# Patient Record
Sex: FEMALE | Race: WHITE | URBAN - METROPOLITAN AREA
[De-identification: names, ages, dates, MRNs, and addresses within clinical notes are randomized per-mention and may not be internally consistent; named-entity substitution may affect disease eponyms.]

---

## 2017-07-19 ENCOUNTER — APPOINTMENT (OUTPATIENT)
Dept: GENERAL RADIOLOGY | Facility: CLINIC | Age: 37
End: 2017-07-19
Attending: EMERGENCY MEDICINE
Payer: COMMERCIAL

## 2017-07-19 ENCOUNTER — HOSPITAL ENCOUNTER (EMERGENCY)
Facility: CLINIC | Age: 37
Discharge: HOME | End: 2017-07-19
Attending: EMERGENCY MEDICINE | Admitting: EMERGENCY MEDICINE
Payer: COMMERCIAL

## 2017-07-19 VITALS
DIASTOLIC BLOOD PRESSURE: 77 MMHG | HEIGHT: 68 IN | OXYGEN SATURATION: 100 % | HEART RATE: 75 BPM | SYSTOLIC BLOOD PRESSURE: 119 MMHG | TEMPERATURE: 98.8 F | RESPIRATION RATE: 18 BRPM

## 2017-07-19 DIAGNOSIS — S20.229A BACK CONTUSION, UNSPECIFIED LATERALITY, INITIAL ENCOUNTER: ICD-10-CM
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
## 2017-07-19 PROCEDURE — 72072 X-RAY EXAM THORAC SPINE 3VWS: CPT

## 2017-07-19 PROCEDURE — 25000132 ZZH RX MED GY IP 250 OP 250 PS 637: Performed by: EMERGENCY MEDICINE

## 2017-07-19 PROCEDURE — 99285 EMERGENCY DEPT VISIT HI MDM: CPT

## 2017-07-19 PROCEDURE — 72220 X-RAY EXAM SACRUM TAILBONE: CPT

## 2017-07-19 PROCEDURE — 72100 X-RAY EXAM L-S SPINE 2/3 VWS: CPT

## 2017-07-19 RX ORDER — HYDROCODONE BITARTRATE AND ACETAMINOPHEN 5; 325 MG/1; MG/1
2 TABLET ORAL ONCE
Status: COMPLETED | OUTPATIENT
Start: 2017-07-19 | End: 2017-07-19

## 2017-07-19 RX ADMIN — HYDROCODONE BITARTRATE AND ACETAMINOPHEN 2 TABLET: 5; 325 TABLET ORAL at 20:47

## 2017-07-19 ASSESSMENT — ENCOUNTER SYMPTOMS
BACK PAIN: 1
DIZZINESS: 1
LIGHT-HEADEDNESS: 1
SHORTNESS OF BREATH: 1

## 2017-07-19 NOTE — ED AVS SNAPSHOT
Emergency Department    [address] [facility] [address]    Phone:  [phone]    Fax:  [phone]                                       Court Mast   MRN: [id number]    Department:   Emergency Department   Date of Visit:  7/19/2017           Patient Information     Date Of Birth          1980        Your diagnoses for this visit were:     Fall on stairs, initial encounter     Back contusion, unspecified laterality, initial encounter        You were seen by Danish Tamayo MD.      Follow-up Information     Follow up with  Emergency Department.    Specialty:  EMERGENCY MEDICINE    Why:  As needed, If symptoms worsen    Contact information:    [address] [facility] [address] [phone]        Discharge Instructions         Back Contusion    You have a contusion to your back. A contusion is also called a bruise. There is swelling and some bleeding under the skin. The skin may be purplish. You may have muscle aching and stiffness in the area of the bruise. There are no broken bones.  Contusions heal on their own, without further treatment. However, pain and skin discoloration may take weeks to months to go away.   Home care    Rest. Avoid heavy lifting, strenuous exertion, or any activity that causes pain.    Ice the area to reduce pain and swelling. Put ice cubes in a plastic bag or use a cold pack. (Wrap the cold source in a thin towel. Do not place it directly on your skin.) Ice the injured area for 20 minutes every 1-2 hours the first day. Continue with ice packs 3-4 times a day for the next two days, then as needed for the relief of pain and swelling.    Take any prescribed pain medication. If none was prescribed, take acetaminophen, ibuprofen, or naproxen to control pain.  Follow-up care  Follow up with your healthcare provider, or as directed. Call if you are not better in 1-2 weeks.  When to seek medical advice  Call your healthcare provider  for any of the following:    New or worsening pain    Increased swelling around the bruise    Pain spreads to one or both legs    Weakness or numbness in one or both legs     Loss of bowel or bladder control    Numbness in the groin or genital area    Fever of 100.4 F (38 C) or higher, or as directed by your healthcare provider  Date Last Reviewed: 6/26/2015 2000-2017 [employer]. [address] [place] [address], [place] [address]. All rights reserved. This information is not intended as a substitute for professional medical care. Always follow your healthcare professional's instructions.          24 Hour Appointment Hotline       To make an appointment at any [facility], call [phone] ([phone]). If you don't have a family doctor or clinic, we will help you find one. [facility] are conveniently located to serve the needs of you and your family.             Review of your medicines      Our records show that you are taking the medicines listed below. If these are incorrect, please call your family doctor or clinic.        Dose / Directions Last dose taken    LABETALOL HCL PO   Dose:  100 mg        Take 100 mg by mouth 2 times daily   Refills:  0        PRENATAL MULTIVIT-IRON PO        Refills:  0                Procedures and tests performed during your visit     Lumbar spine XR, 2-3 views    Thoracic spine XR, 3 views    XR Sacrum and Coccyx 2 Views      Orders Needing Specimen Collection     None      Pending Results     Date and Time Order Name Status Description    7/19/2017 2041 XR Sacrum and Coccyx 2 Views Preliminary     7/19/2017 2041 Lumbar spine XR, 2-3 views Preliminary     7/19/2017 2041 Thoracic spine XR, 3 views Preliminary             Pending Culture Results     No orders found from 7/17/2017 to 7/20/2017.            Pending Results Instructions     If you had any lab results that were not finalized at the time of your Discharge, you can call the ED Lab Result RN at  [phone]. You will be contacted by this team for any positive Lab results or changes in treatment. The nurses are available 7 days a week from 10A to 6:30P.  You can leave a message 24 hours per day and they will return your call.        Test Results From Your Hospital Stay        7/19/2017  9:29 PM      Narrative     THORACIC AND LUMBOSACRAL SPINE 9 VIEWS   7/19/2017 9:15 PM     HISTORY: Fall on stairs. Tailbone pain.    COMPARISON: None.        Impression     IMPRESSION: No visualized acute fracture or malalignment of the  thoracic or lumbosacral spine.         7/19/2017  9:29 PM      Narrative     THORACIC AND LUMBOSACRAL SPINE 9 VIEWS   7/19/2017 9:15 PM     HISTORY: Fall on stairs. Tailbone pain.    COMPARISON: None.        Impression     IMPRESSION: No visualized acute fracture or malalignment of the  thoracic or lumbosacral spine.         7/19/2017  9:29 PM      Narrative     THORACIC AND LUMBOSACRAL SPINE 9 VIEWS   7/19/2017 9:15 PM     HISTORY: Fall on stairs. Tailbone pain.    COMPARISON: None.        Impression     IMPRESSION: No visualized acute fracture or malalignment of the  thoracic or lumbosacral spine.                Clinical Quality Measure: Blood Pressure Screening     Your blood pressure was checked while you were in the emergency department today. The last reading we obtained was  BP: 119/77 . Please read the guidelines below about what these numbers mean and what you should do about them.  If your systolic blood pressure (the top number) is less than 120 and your diastolic blood pressure (the bottom number) is less than 80, then your blood pressure is normal. There is nothing more that you need to do about it.  If your systolic blood pressure (the top number) is 120-139 or your diastolic blood pressure (the bottom number) is 80-89, your blood pressure may be higher than it should be. You should have your blood pressure rechecked within a year by a primary care provider.  If your systolic  "blood pressure (the top number) is 140 or greater or your diastolic blood pressure (the bottom number) is 90 or greater, you may have high blood pressure. High blood pressure is treatable, but if left untreated over time it can put you at risk for heart attack, stroke, or kidney failure. You should have your blood pressure rechecked by a primary care provider within the next 4 weeks.  If your provider in the emergency department today gave you specific instructions to follow-up with your doctor or provider even sooner than that, you should follow that instruction and not wait for up to 4 weeks for your follow-up visit.        Thank you for choosing [facility]       Thank you for choosing [facility] for your care. Our goal is always to provide you with excellent care. Hearing back from our patients is one way we can continue to improve our services. Please take a few minutes to complete the written survey that you may receive in the mail after you visit with us. Thank you!        [employer] Relevant Information     [employer] lets you send messages to your doctor, view your test results, renew your prescriptions, schedule appointments and more. To sign up, go to [email or web address] . Click on \"Log in\" on the left side of the screen, which will take you to the Welcome page. Then click on \"Sign up Now\" on the right side of the page.     You will be asked to enter the access code listed below, as well as some personal information. Please follow the directions to create your username and password.     Your access code is: [email or web address]  Expires: 10/17/2017  9:55 PM     Your access code will  in 90 days. If you need help or a new code, please call your [facility] clinic or [phone].        Care EveryWhere ID     This is your Care EveryWhere ID. This could be used by other organizations to access your [facility] medical records  [id number]        Equal Access to Services     DWIGHT MOSQUEDA : phill Cortes " jaylyn gutierrez waxay idiin hayaan adeeg kharash la'aan ah. So [facility] [phone].    ATENCIÓN: Si habla jhoan, tiene a abreu disposición servicios gratuitos de asistencia lingüística. Llame al [phone].    We comply with applicable federal civil rights laws and [place] laws. We do not discriminate on the basis of race, color, national origin, age, disability sex, sexual orientation or gender identity.            After Visit Summary       This is your record. Keep this with you and show to your community pharmacist(s) and doctor(s) at your next visit.

## 2017-07-19 NOTE — ED AVS SNAPSHOT
Emergency Department    [address] [facility] [address]    Phone:  [phone]    Fax:  [phone]                                       Court Mast   MRN: [id number]    Department:   Emergency Department   Date of Visit:  7/19/2017           After Visit Summary Signature Page     I have received my discharge instructions, and my questions have been answered. I have discussed any challenges I see with this plan with the nurse or doctor.    ..........................................................................................................................................  Patient/Patient Representative Signature      ..........................................................................................................................................  Patient Representative Print Name and Relationship to Patient    ..................................................               ................................................  Date                                            Time    ..........................................................................................................................................  Reviewed by Signature/Title    ...................................................              ..............................................  Date                                                            Time

## 2017-07-20 NOTE — ED PROVIDER NOTES
"  History     Chief Complaint:  Fall       HPI   Court Mast is a 36 year old female who presents with a fall. The patient reports that 15 minutes ago she slipped down 5 stairs at home on the deck and landed on the middle of her back and her tailbone. She reports that she \"got the wind knocked out of her\" but did not hit her head or lose consciousness. Initially after the fall she could not speak, felt like she was going to pass out, and was dizzy but was able to move her legs. She notes that upon arrival to the ED her hands began to tingle and is now currently feeling lower back pain, tailbone pain, dizziness and shortness of breath. The patient did not take any medication for her symptoms following the fall. Of note, the patient had a recent cyst burst last week.    Allergies:  Nickel    Medications:    Labetalol  Prenatal Multivitamin    Past Medical History:    HTN (Controlled)    Past Surgical History:    [place] teeth extraction    Family History:    History reviewed.  No significant family history.     Social History:  Relationship status:   Tobacco use: Neg  Alcohol use: Pos (2 drinks/week)  The patient presents with her .     Review of Systems   Respiratory: Positive for shortness of breath.    Musculoskeletal: Positive for back pain.   Neurological: Positive for dizziness and light-headedness. Negative for syncope.   All other systems reviewed and are negative.    Physical Exam     Patient Vitals for the past 24 hrs:   BP Temp Temp src Pulse Resp SpO2 Height   07/19/17 2025 119/77 98.8  F (37.1  C) Oral 75 18 100 % 1.727 m (5' 8\")     Physical Exam  General: Appears well-developed and well-nourished.   Head: No signs of trauma.   Mouth/Throat: Oropharynx is clear and moist.   Neck: Normal range of motion. No midline tenderness.  CV: Normal rate and regular rhythm.    Resp: Effort normal and breath sounds normal. No respiratory distress.   GI: Soft. There is no tenderness or guarding.  " Normal bowel sounds.    MSK: No specific midline spine tenderness or tailbone tenderness.    Neuro: The patient is alert and oriented.  Strength in upper/lower extremities normal and symmetrical.   Sensation normal. Speech normal.  GCS 15  Skin: Skin is warm and dry. Superficial abrasion to mid back.  No lacerations.  Psych: normal mood and affect. behavior is normal.     Emergency Department Course   Imaging:  Radiographic findings were communicated with the patient and family who voiced understanding of the findings.    Sacrum and Coccyx XR, per radiology:   No visualized acute fracture or malalignment of the thoracic or lumbosacral spine.    Thoracic Spine XR, per radiology:   No visualized acute fracture or malalignment of the thoracic or lumbosacral spine.    Lumbar Spine XR, per radiology:   No visualized acute fracture or malalignment of the thoracic or lumbosacral spine.    Interventions:  2047: Norco, 2 Tablets, PO    Emergency Department Course:  Nursing notes and vitals reviewed.  I performed an exam of the patient as documented above.  The above workup was undertaken.  Findings and plan explained to the Patient and spouse. Patient discharged home, status improved, with instructions regarding supportive care, medications, and reasons to return as well as the importance of close follow-up was reviewed.    Impression & Plan    Medical Decision Making:  Court Mast is a 36-year-old woman who presents due to back and tailbone pain.  Patient slipped and fell on the stairs striking her back.  She had been able to ambulate since, but has had pain particularly with sitting.  On my evaluation, there was an abrasion to the back, but no lacerations.  There is no specific midline or tailbone tenderness to palpation.  X-rays were obtained that did not show any signs of fracture or other acute process.  In this setting, I feel the patient is appropriate for discharge home with supportive care measures.  She  was instructed to return to the ER for any new worsening symptoms or further concerns.    Diagnosis:    ICD-10-CM   1. Fall on stairs, initial encounter W10.9XXA   2. Back contusion, unspecified laterality, initial encounter S20.229A     Disposition:  Discharged to home.    Isabela RASHEED, am serving as a scribe on 7/19/2017 at 8:33 PM to personally document services performed by Danish Tamayo MD, based on my observations and the provider's statements to me.     EMERGENCY DEPARTMENT       Danish Tamayo MD  07/19/17 7643

## 2017-07-20 NOTE — DISCHARGE INSTRUCTIONS
Back Contusion    You have a contusion to your back. A contusion is also called a bruise. There is swelling and some bleeding under the skin. The skin may be purplish. You may have muscle aching and stiffness in the area of the bruise. There are no broken bones.  Contusions heal on their own, without further treatment. However, pain and skin discoloration may take weeks to months to go away.   Home care    Rest. Avoid heavy lifting, strenuous exertion, or any activity that causes pain.    Ice the area to reduce pain and swelling. Put ice cubes in a plastic bag or use a cold pack. (Wrap the cold source in a thin towel. Do not place it directly on your skin.) Ice the injured area for 20 minutes every 1-2 hours the first day. Continue with ice packs 3-4 times a day for the next two days, then as needed for the relief of pain and swelling.    Take any prescribed pain medication. If none was prescribed, take acetaminophen, ibuprofen, or naproxen to control pain.  Follow-up care  Follow up with your healthcare provider, or as directed. Call if you are not better in 1-2 weeks.  When to seek medical advice  Call your healthcare provider for any of the following:    New or worsening pain    Increased swelling around the bruise    Pain spreads to one or both legs    Weakness or numbness in one or both legs     Loss of bowel or bladder control    Numbness in the groin or genital area    Fever of 100.4 F (38 C) or higher, or as directed by your healthcare provider  Date Last Reviewed: 6/26/2015 2000-2017 [employer]. [address] [place] [address], [place] [address]. All rights reserved. This information is not intended as a substitute for professional medical care. Always follow your healthcare professional's instructions.

## 2018-03-18 ENCOUNTER — HEALTH MAINTENANCE LETTER (OUTPATIENT)
Age: 38
End: 2018-03-18

## 2018-03-27 ENCOUNTER — APPOINTMENT (OUTPATIENT)
Dept: GENERAL RADIOLOGY | Facility: CLINIC | Age: 38
End: 2018-03-27
Attending: EMERGENCY MEDICINE
Payer: COMMERCIAL

## 2018-03-27 ENCOUNTER — HOSPITAL ENCOUNTER (EMERGENCY)
Facility: CLINIC | Age: 38
Discharge: HOME | End: 2018-03-27
Attending: EMERGENCY MEDICINE | Admitting: EMERGENCY MEDICINE
Payer: COMMERCIAL

## 2018-03-27 VITALS
WEIGHT: 168 LBS | HEIGHT: 68 IN | RESPIRATION RATE: 20 BRPM | OXYGEN SATURATION: 95 % | SYSTOLIC BLOOD PRESSURE: 117 MMHG | TEMPERATURE: 99.9 F | DIASTOLIC BLOOD PRESSURE: 79 MMHG | BODY MASS INDEX: 25.46 KG/M2

## 2018-03-27 DIAGNOSIS — J18.9 COMMUNITY ACQUIRED PNEUMONIA OF RIGHT MIDDLE LOBE OF LUNG: ICD-10-CM

## 2018-03-27 LAB
ALBUMIN SERPL-MCNC: 3.3 G/DL (ref 3.4–5)
ALP SERPL-CCNC: 111 U/L (ref 40–150)
ALT SERPL W P-5'-P-CCNC: 25 U/L (ref 0–50)
ANION GAP SERPL CALCULATED.3IONS-SCNC: 8 MMOL/L (ref 3–14)
AST SERPL W P-5'-P-CCNC: 25 U/L (ref 0–45)
BASOPHILS # BLD AUTO: 0.1 10E9/L (ref 0–0.2)
BASOPHILS NFR BLD AUTO: 0.6 %
BILIRUB SERPL-MCNC: 0.5 MG/DL (ref 0.2–1.3)
BUN SERPL-MCNC: 9 MG/DL (ref 7–30)
CALCIUM SERPL-MCNC: 8.4 MG/DL (ref 8.5–10.1)
CHLORIDE SERPL-SCNC: 104 MMOL/L (ref 94–109)
CO2 SERPL-SCNC: 24 MMOL/L (ref 20–32)
CREAT SERPL-MCNC: 0.74 MG/DL (ref 0.52–1.04)
DIFFERENTIAL METHOD BLD: ABNORMAL
EOSINOPHIL # BLD AUTO: 0.4 10E9/L (ref 0–0.7)
EOSINOPHIL NFR BLD AUTO: 3.7 %
ERYTHROCYTE [DISTWIDTH] IN BLOOD BY AUTOMATED COUNT: 12.4 % (ref 10–15)
FLUAV+FLUBV AG SPEC QL: NEGATIVE
FLUAV+FLUBV AG SPEC QL: NEGATIVE
GFR SERPL CREATININE-BSD FRML MDRD: 88 ML/MIN/1.7M2
GLUCOSE SERPL-MCNC: 105 MG/DL (ref 70–99)
HCT VFR BLD AUTO: 33.8 % (ref 35–47)
HGB BLD-MCNC: 11.6 G/DL (ref 11.7–15.7)
IMM GRANULOCYTES # BLD: 0 10E9/L (ref 0–0.4)
IMM GRANULOCYTES NFR BLD: 0.3 %
LACTATE BLD-SCNC: 0.8 MMOL/L (ref 0.7–2)
LYMPHOCYTES # BLD AUTO: 1.2 10E9/L (ref 0.8–5.3)
LYMPHOCYTES NFR BLD AUTO: 12.9 %
MCH RBC QN AUTO: 30.1 PG (ref 26.5–33)
MCHC RBC AUTO-ENTMCNC: 34.3 G/DL (ref 31.5–36.5)
MCV RBC AUTO: 88 FL (ref 78–100)
MONOCYTES # BLD AUTO: 1 10E9/L (ref 0–1.3)
MONOCYTES NFR BLD AUTO: 10 %
NEUTROPHILS # BLD AUTO: 6.9 10E9/L (ref 1.6–8.3)
NEUTROPHILS NFR BLD AUTO: 72.5 %
NRBC # BLD AUTO: 0 10*3/UL
NRBC BLD AUTO-RTO: 0 /100
PLATELET # BLD AUTO: 284 10E9/L (ref 150–450)
POTASSIUM SERPL-SCNC: 3.7 MMOL/L (ref 3.4–5.3)
PROT SERPL-MCNC: 7.2 G/DL (ref 6.8–8.8)
RBC # BLD AUTO: 3.85 10E12/L (ref 3.8–5.2)
SODIUM SERPL-SCNC: 136 MMOL/L (ref 133–144)
SPECIMEN SOURCE: NORMAL
WBC # BLD AUTO: 9.5 10E9/L (ref 4–11)

## 2018-03-27 PROCEDURE — 99284 EMERGENCY DEPT VISIT MOD MDM: CPT | Mod: 25

## 2018-03-27 PROCEDURE — 25000128 H RX IP 250 OP 636: Performed by: EMERGENCY MEDICINE

## 2018-03-27 PROCEDURE — 83605 ASSAY OF LACTIC ACID: CPT | Performed by: EMERGENCY MEDICINE

## 2018-03-27 PROCEDURE — 87804 INFLUENZA ASSAY W/OPTIC: CPT | Performed by: EMERGENCY MEDICINE

## 2018-03-27 PROCEDURE — 40000275 ZZH STATISTIC RCP TIME EA 10 MIN

## 2018-03-27 PROCEDURE — 80053 COMPREHEN METABOLIC PANEL: CPT | Performed by: EMERGENCY MEDICINE

## 2018-03-27 PROCEDURE — 87801 DETECT AGNT MULT DNA AMPLI: CPT | Performed by: EMERGENCY MEDICINE

## 2018-03-27 PROCEDURE — 94640 AIRWAY INHALATION TREATMENT: CPT

## 2018-03-27 PROCEDURE — 96360 HYDRATION IV INFUSION INIT: CPT

## 2018-03-27 PROCEDURE — 25000125 ZZHC RX 250: Performed by: EMERGENCY MEDICINE

## 2018-03-27 PROCEDURE — 25000132 ZZH RX MED GY IP 250 OP 250 PS 637: Performed by: EMERGENCY MEDICINE

## 2018-03-27 PROCEDURE — 71046 X-RAY EXAM CHEST 2 VIEWS: CPT

## 2018-03-27 PROCEDURE — 85025 COMPLETE CBC W/AUTO DIFF WBC: CPT | Performed by: EMERGENCY MEDICINE

## 2018-03-27 RX ORDER — DOXYCYCLINE 100 MG/1
100 CAPSULE ORAL 2 TIMES DAILY
Qty: 28 CAPSULE | Refills: 0 | Status: SHIPPED | OUTPATIENT
Start: 2018-03-27 | End: 2018-03-27

## 2018-03-27 RX ORDER — DOXYCYCLINE 100 MG/1
100 CAPSULE ORAL 2 TIMES DAILY
Qty: 28 CAPSULE | Refills: 0 | Status: SHIPPED | OUTPATIENT
Start: 2018-03-27

## 2018-03-27 RX ORDER — ACETAMINOPHEN 500 MG
1000 TABLET ORAL ONCE
Status: COMPLETED | OUTPATIENT
Start: 2018-03-27 | End: 2018-03-27

## 2018-03-27 RX ADMIN — SODIUM CHLORIDE 1000 ML: 9 INJECTION, SOLUTION INTRAVENOUS at 21:07

## 2018-03-27 RX ADMIN — LIDOCAINE HYDROCHLORIDE 3 ML: 40 INJECTION, SOLUTION RETROBULBAR; TOPICAL at 21:32

## 2018-03-27 RX ADMIN — ACETAMINOPHEN 1000 MG: 500 TABLET, FILM COATED ORAL at 21:29

## 2018-03-27 ASSESSMENT — ENCOUNTER SYMPTOMS
FEVER: 1
COUGH: 1
ABDOMINAL PAIN: 1
CHILLS: 1
SHORTNESS OF BREATH: 1

## 2018-03-27 NOTE — ED AVS SNAPSHOT
Emergency Department    [address] [facility] [address]    Phone:  [phone]    Fax:  [phone]                                       Court Mast   MRN: [id number]    Department:   Emergency Department   Date of Visit:  3/27/2018           Patient Information     Date Of Birth          1980        Your diagnoses for this visit were:     Community acquired pneumonia of right middle lobe of lung (H)        You were seen by Trierweiler, Chad A, MD.      Follow-up Information     Follow up with Clinic, [facility] In 1 week.    Contact information:    [address] [facility] [id number] [phone]          Follow up with  Emergency Department.    Specialty:  EMERGENCY MEDICINE    Why:  If symptoms worsen    Contact information:    [address] [facility] [address] [phone]        Discharge Instructions       Discharge Instructions  Bronchitis, Pneumonia, Bronchospasm    You were seen today for a chest infection or inflammation. If your provider decided this was due to a bacterial infection, you may need an antibiotic. Sometimes these are caused by a virus, and then an antibiotic will not help.     Generally, every Emergency Department visit should have a follow-up clinic visit with either a primary or a specialty clinic/provider. Please follow-up as instructed by your emergency provider today.    Return to the Emergency Department if:    Your breathing gets much worse.    You are very weak, or feel much more ill.    You develop new symptoms, such as chest pain.    You cough up blood.    You are vomiting (throwing up) enough that you cannot keep fluids or your medicine down.    What can I do to help myself?    Fill any prescriptions the provider gave you and take them right away--especially antibiotics. Be sure to finish the whole antibiotic prescription.    You may be given a prescription for an inhaler, which can help  loosen tight air passages.  Use this as needed, but not more often than directed. Inhalers work much better when used with a spacer.     You may be given a prescription for a steroid to reduce inflammation. Used long-term, these can have side effects, but for short-term use they are safe. You may notice restlessness or increased appetite.        You may use non-prescription cough or cold medicines. Cough medicines may help, but don t make the cough go away completely.     Avoid smoke, because this can make your symptoms worse. If you smoke, this may be a good time to quit! Consider using nicotine lozenges, gum, or patches to reduce cravings.     If you have a fever, Tylenol  (acetaminophen), Motrin  (ibuprofen), or Advil  (ibuprofen) may help bring fever down and may help you feel more comfortable. Be sure to read and follow the package directions, and ask your provider if you have questions.    Be sure to get your flu shot each year.  For certain ages, the pneumonia shot can help prevent pneumonia.  If you were given a prescription for medicine here today, be sure to read all of the information (including the package insert) that comes with your prescription.  This will include important information about the medicine, its side effects, and any warnings that you need to know about.  The pharmacist who fills the prescription can provide more information and answer questions you may have about the medicine.  If you have questions or concerns that the pharmacist cannot address, please call or return to the Emergency Department.     Remember that you can always come back to the Emergency Department if you are not able to see your regular provider in the amount of time listed above, if you get any new symptoms, or if there is anything that worries you.      24 Hour Appointment Hotline       To make an appointment at any [facility], call [phone] ([phone]). If you don't have a family doctor or clinic,  we will help you find one. [facility] are conveniently located to serve the needs of you and your family.             Review of your medicines      START taking        Dose / Directions Last dose taken    doxycycline 100 MG capsule   Commonly known as:  VIBRAMYCIN   Dose:  100 mg   Quantity:  28 capsule        Take 1 capsule (100 mg) by mouth 2 times daily   Refills:  0          Our records show that you are taking the medicines listed below. If these are incorrect, please call your family doctor or clinic.        Dose / Directions Last dose taken    LABETALOL HCL PO   Dose:  100 mg        Take 100 mg by mouth 2 times daily   Refills:  0        PRENATAL MULTIVIT-IRON PO        Refills:  0                Prescriptions were sent or printed at these locations (1 Prescription)                   [facility] [address] [place] [address] [address] & [facility]   [address] [place], [place] [address] [address]    Telephone:  [phone]   Fax:  [phone]   Hours:                  E-Prescribed (1 of 1)         doxycycline (VIBRAMYCIN) 100 MG capsule                Procedures and tests performed during your visit     Bordetella pert parapert DNA pcr    CBC with platelets differential    Chest XR,  PA & LAT    Comprehensive metabolic panel    Influenza A/B antigen    Lactic acid whole blood      Orders Needing Specimen Collection     None      Pending Results     Date and Time Order Name Status Description    3/27/2018 2114 Bordetella pert parapert DNA pcr In process             Pending Culture Results     Date and Time Order Name Status Description    3/27/2018 2114 Bordetella pert parapert DNA pcr In process             Pending Results Instructions     If you had any lab results that were not finalized at the time of your Discharge, you can call the ED Lab Result RN at [phone]. You will be contacted by this team for any positive Lab results or changes in treatment. The nurses are available 7 days a  week from 10A to 6:30P.  You can leave a message 24 hours per day and they will return your call.        Test Results From Your Hospital Stay        3/27/2018  9:19 PM      Component Results     Component Value Ref Range & Units Status    WBC 9.5 4.0 - 11.0 10e9/L Final    RBC Count 3.85 3.8 - 5.2 10e12/L Final    Hemoglobin 11.6 (L) 11.7 - 15.7 g/dL Final    Hematocrit 33.8 (L) 35.0 - 47.0 % Final    MCV 88 78 - 100 fl Final    MCH 30.1 26.5 - 33.0 pg Final    MCHC 34.3 31.5 - 36.5 g/dL Final    RDW 12.4 10.0 - 15.0 % Final    Platelet Count 284 150 - 450 10e9/L Final    Diff Method Automated Method  Final    % Neutrophils 72.5 % Final    % Lymphocytes 12.9 % Final    % Monocytes 10.0 % Final    % Eosinophils 3.7 % Final    % Basophils 0.6 % Final    % Immature Granulocytes 0.3 % Final    Nucleated RBCs 0 0 /100 Final    Absolute Neutrophil 6.9 1.6 - 8.3 10e9/L Final    Absolute Lymphocytes 1.2 0.8 - 5.3 10e9/L Final    Absolute Monocytes 1.0 0.0 - 1.3 10e9/L Final    Absolute Eosinophils 0.4 0.0 - 0.7 10e9/L Final    Absolute Basophils 0.1 0.0 - 0.2 10e9/L Final    Abs Immature Granulocytes 0.0 0 - 0.4 10e9/L Final    Absolute Nucleated RBC 0.0  Final         3/27/2018  9:38 PM      Component Results     Component Value Ref Range & Units Status    Sodium 136 133 - 144 mmol/L Final    Potassium 3.7 3.4 - 5.3 mmol/L Final    Chloride 104 94 - 109 mmol/L Final    Carbon Dioxide 24 20 - 32 mmol/L Final    Anion Gap 8 3 - 14 mmol/L Final    Glucose 105 (H) 70 - 99 mg/dL Final    Urea Nitrogen 9 7 - 30 mg/dL Final    Creatinine 0.74 0.52 - 1.04 mg/dL Final    GFR Estimate 88 >60 mL/min/1.7m2 Final    Non  GFR Calc    GFR Estimate If Black >90 >60 mL/min/1.7m2 Final    African American GFR Calc    Calcium 8.4 (L) 8.5 - 10.1 mg/dL Final    Bilirubin Total 0.5 0.2 - 1.3 mg/dL Final    Albumin 3.3 (L) 3.4 - 5.0 g/dL Final    Protein Total 7.2 6.8 - 8.8 g/dL Final    Alkaline Phosphatase 111 40 - 150 U/L  Final    ALT 25 0 - 50 U/L Final    AST 25 0 - 45 U/L Final         3/27/2018  9:20 PM      Component Results     Component Value Ref Range & Units Status    Lactic Acid 0.8 0.7 - 2.0 mmol/L Final         3/27/2018  9:52 PM         3/27/2018 10:12 PM      Component Results     Component Value Ref Range & Units Status    Influenza A/B Agn Specimen Nasal  Final    Influenza A Negative NEG^Negative Final    Influenza B Negative NEG^Negative Final    Test results must be correlated with clinical data. If necessary, results   should be confirmed by a molecular assay or viral culture.           3/27/2018 10:02 PM      Narrative     XR CHEST 2 VW 3/27/2018 9:56 PM    HISTORY: Cough.    COMPARISON: None.        Impression     IMPRESSION: There is opacification of the right middle lobe,  suggestive of infection. The left lung is clear. No pleural effusions  or pneumothorax. Normal heart and mediastinum.    MILA BLAIR MD                Clinical Quality Measure: Blood Pressure Screening     Your blood pressure was checked while you were in the emergency department today. The last reading we obtained was  BP: 117/79 . Please read the guidelines below about what these numbers mean and what you should do about them.  If your systolic blood pressure (the top number) is less than 120 and your diastolic blood pressure (the bottom number) is less than 80, then your blood pressure is normal. There is nothing more that you need to do about it.  If your systolic blood pressure (the top number) is 120-139 or your diastolic blood pressure (the bottom number) is 80-89, your blood pressure may be higher than it should be. You should have your blood pressure rechecked within a year by a primary care provider.  If your systolic blood pressure (the top number) is 140 or greater or your diastolic blood pressure (the bottom number) is 90 or greater, you may have high blood pressure. High blood pressure is treatable, but if left untreated over  "time it can put you at risk for heart attack, stroke, or kidney failure. You should have your blood pressure rechecked by a primary care provider within the next 4 weeks.  If your provider in the emergency department today gave you specific instructions to follow-up with your doctor or provider even sooner than that, you should follow that instruction and not wait for up to 4 weeks for your follow-up visit.        Thank you for choosing [facility]       Thank you for choosing [facility] for your care. Our goal is always to provide you with excellent care. Hearing back from our patients is one way we can continue to improve our services. Please take a few minutes to complete the written survey that you may receive in the mail after you visit with us. Thank you!        [employer] Information     [employer] lets you send messages to your doctor, view your test results, renew your prescriptions, schedule appointments and more. To sign up, go to [email or web address] . Click on \"Log in\" on the left side of the screen, which will take you to the Welcome page. Then click on \"Sign up Now\" on the right side of the page.     You will be asked to enter the access code listed below, as well as some personal information. Please follow the directions to create your username and password.     Your access code is: [email or web address]  Expires: 2018 10:47 PM     Your access code will  in 90 days. If you need help or a new code, please call your [facility] clinic or [phone].        Care EveryWhere ID     This is your Care EveryWhere ID. This could be used by other organizations to access your [facility] medical records  [id number]        Equal Access to Services     [facility]: Hadii flaco [place], waaxda luqadaha, qaybta kaaltrace fox . So [facility] [phone].    ATENCIÓN: Si habla español, tiene a abreu disposición servicios gratuitos de asistencia lingüística. Llame al " [phone].    We comply with applicable federal civil rights laws and [place] laws. We do not discriminate on the basis of race, color, national origin, age, disability, sex, sexual orientation, or gender identity.            After Visit Summary       This is your record. Keep this with you and show to your community pharmacist(s) and doctor(s) at your next visit.

## 2018-03-27 NOTE — ED AVS SNAPSHOT
Emergency Department    [address] [facility] [address]    Phone:  [phone]    Fax:  [phone]                                       Court Mast   MRN: [id number]    Department:   Emergency Department   Date of Visit:  3/27/2018           After Visit Summary Signature Page     I have received my discharge instructions, and my questions have been answered. I have discussed any challenges I see with this plan with the nurse or doctor.    ..........................................................................................................................................  Patient/Patient Representative Signature      ..........................................................................................................................................  Patient Representative Print Name and Relationship to Patient    ..................................................               ................................................  Date                                            Time    ..........................................................................................................................................  Reviewed by Signature/Title    ...................................................              ..............................................  Date                                                            Time

## 2018-03-28 LAB
B PERT+PARAPERT DNA PNL SPEC NAA+PROBE: NEGATIVE
SPECIMEN SOURCE: NORMAL

## 2018-03-28 NOTE — ED PROVIDER NOTES
History     Chief Complaint:  Shortness of breath     The history is provided by the patient.      Court Mast is a 37 year old female with a history of hypertension who presents to the emergency department with her  and kids for evaluation of shortness of breath. The patient reports onset of fever and chills on Monday (8 days ago) during the evening. This returned again the next evening and was accompanied with diaphoresis and abdominal cramping. She was seen by her OB 6 days ago who noted that this did not seem to be related to her D&C, though was started on Amoxicillin for possible bronchitis. Over the weekend she felt that her symptoms were relatively gone except for her cough that was getting worse while they were in [place]. This kept her form sleeping Saturday (3 days ago) and Sunday night and she noticed that speaking exacerbated the cough. Last night she began to fill a bit better and today she was able to run errands and get a cavity filled without any problems. When she got home this evening she sat down to look at emails an had sudden onset of a coughing fit. She took a liquid cough medication but had no relief with this. Eventually she was unable to breath and gasping for breath, prompting the  to bring her to the emergency department for evalaution today. Here in the ED the patient notes that her cough is still exacerbated with speaking and states that her shortness of breath is really only present after a coughing episode. She says that she has had no bleeding from the D&C but has had some abdominal pain with the coughing. She has no other complaints.     Allergies:  Nickel      Medications:    Labetalol    Past Medical History:    hypertension     Past Surgical History:    History reviewed. No pertinent surgical history.     Family History:    History reviewed. No pertinent family history.      Social History:  Presents with  and kids  Non-smoker. Social  "drinker.  Marital Status:        Review of Systems   Constitutional: Positive for chills and fever.   Respiratory: Positive for cough and shortness of breath.    Gastrointestinal: Positive for abdominal pain.   Genitourinary: Negative for vaginal bleeding.   All other systems reviewed and are negative.    Physical Exam     Patient Vitals for the past 24 hrs:   BP Temp Temp src Heart Rate Resp SpO2 Height Weight   03/27/18 2245 - - - - - 95 % - -   03/27/18 2230 117/79 - - - - 95 % - -   03/27/18 2207 - 99.9  F (37.7  C) Oral - - - - -   03/27/18 2132 - - - - - 99 % - -   03/27/18 2130 (!) 138/95 - - 108 - 99 % - -   03/27/18 2105 - - - - - 95 % - -   03/27/18 2056 - - - - - 94 % - -   03/27/18 2044 128/87 101  F (38.3  C) Temporal 120 20 95 % 1.727 m (5' 8\") 76.2 kg (168 lb)      Physical Exam  General: Young woman, frequently coughing, otherwise showing no obvious distress.    Eye:  Pupils are equal, round, and reactive.  Extraocular movements intact.    ENT:  No rhinorrhea.  Moist mucus membranes.  Normal tongue and tonsil.    Cardiac:  Regular rate and rhythm.  No murmurs, gallops, or rubs.    Pulmonary:  Clear to auscultation bilaterally.  No wheezes, rales, or rhonchi.  Frequent dry cough without increased work of breathing.    Abdomen:  Positive bowel sounds.  Abdomen is soft and non-distended, without focal tenderness.    Musculoskeletal:  Normal movement of all extremities without evidence for deficit.    Skin:  Warm and dry without rashes.    Neurologic:  Non-focal exam without asymmetric weakness or numbness.     Psychiatric:  Normal affect with appropriate interaction with examiner.      Emergency Department Course     Imaging:  Radiographic findings were communicated with the patient and family who voiced understanding of the findings.    XR Chest, PA and LAT:  IMPRESSION: There is opacification of the right middle lobe, suggestive of infection. The left lung is clear. No pleural effusions  or " pneumothorax. Normal heart and mediastinum.     MILA BLAIR MD    Imaging independently reviewed and agree with radiologist interpretation.      Laboratory:  CBC: HGB 11.6 (low), ow WNL (WBC 9.5, )     CMP: Glucose 105 (high), Calcium 8.4 (low), Albumin 3.3 (low), ow WNL (Creatinine 0.74)   Lactic acid whole blood: 0.8   Bordetella pert parapert DNC PCR: Pending   Influenza A/B antigen: Negative      Interventions:  : NS 1L IV Bolus    : Tylenol 1000 mg PO   : Lidocaine inhaler 4% neb solution 3 ml     Emergency Department Course:  Past medical records, nursing notes, and vitals reviewed.  : I performed an exam of the patient and obtained history, as documented above.     IV inserted and blood drawn. This was sent to the lab for further testing, results above.   Above interventions provided.      The patient was sent for a XR while in the emergency department, findings above.     I personally reviewed the laboratory results with the Patient and spouse and answered all related questions prior to discharge.        : I rechecked the patient. Findings and plan explained to the Patient and spouse. Patient discharged home with instructions regarding supportive care, medications, and reasons to return. The importance of close follow-up was reviewed.      Impression & Plan      Medical Decision Making:  This young woman who is approximately 3 weeks status post a dilation and curettage for a missed  presents to us with a fever and cough.  Patient has been following up closely with OB/GYN and has had appropriate resolution of any lower abdominal pain or bleeding.  Her gynecologist does not believe her recent fevers have anything to do with endometritis.  The patient had developed some nasal congestion and cough for which her gynecologist started on amoxicillin.  She thought she was getting better, though her fever had returned today and her cough has been very difficult to control.  On  exam, she is febrile.  Her oxygen levels are normal and her blood pressure is reassuring.  A lactate is negative for signs of severe sepsis.  Chest x-ray shows evidence of a middle lobe pneumonia.  With this, I feel that her source is clearly identified and will be treated with more appropriate antibiotics specifically for lobar pneumonia.  Despite her recent surgical procedure, I do not feel this is likely to represent pulmonary embolism.  I will start her on doxycycline.  She was given 1 lidocaine neb with marked improvement in her coughing.  We discussed using frequent cough syrups and cough drops along with the prescribed antibiotics and antipyretics.  She was advised to follow-up with her primary doctor next week with immediate return to us for any worsening of condition or other emergent concerns.    Diagnosis:    ICD-10-CM    1. Community acquired pneumonia of right middle lobe of lung (H) J18.1        Disposition:   Discharged to home with plan as outlined.      Discharge Medications:  Discharge Medication List as of 3/27/2018 11:16 PM      START taking these medications    Details   doxycycline (VIBRAMYCIN) 100 MG capsule Take 1 capsule (100 mg) by mouth 2 times daily, Disp-28 capsule, R-0, E-Prescribe             Scribe Disclosure:   [place], am serving as a scribe at 9:05 PM on 3/27/2018 to document services personally performed by Trierweiler, Chad A, MD based on my observations and the provider's statements to me.       3/27/2018   EMERGENCY DEPARTMENT      Trierweiler, Chad A, MD  03/28/18 7598